# Patient Record
Sex: MALE | Race: WHITE | Employment: FULL TIME | URBAN - METROPOLITAN AREA
[De-identification: names, ages, dates, MRNs, and addresses within clinical notes are randomized per-mention and may not be internally consistent; named-entity substitution may affect disease eponyms.]

---

## 2017-05-08 ENCOUNTER — OFFICE VISIT (OUTPATIENT)
Dept: ORTHOPEDIC SURGERY | Age: 57
End: 2017-05-08

## 2017-05-08 VITALS
DIASTOLIC BLOOD PRESSURE: 83 MMHG | BODY MASS INDEX: 32.85 KG/M2 | WEIGHT: 256 LBS | RESPIRATION RATE: 16 BRPM | SYSTOLIC BLOOD PRESSURE: 140 MMHG | HEIGHT: 74 IN | HEART RATE: 50 BPM

## 2017-05-08 DIAGNOSIS — M51.26 OTHER INTERVERTEBRAL DISC DISPLACEMENT, LUMBAR REGION: Primary | ICD-10-CM

## 2017-05-08 DIAGNOSIS — M54.16 RADICULOPATHY, LUMBAR REGION: ICD-10-CM

## 2017-05-08 RX ORDER — GABAPENTIN 800 MG/1
800 TABLET ORAL 3 TIMES DAILY
Qty: 90 TAB | Refills: 5 | Status: SHIPPED | OUTPATIENT
Start: 2017-05-08 | End: 2018-05-16 | Stop reason: SDUPTHER

## 2017-05-08 NOTE — MR AVS SNAPSHOT
Visit Information Date & Time Provider Department Dept. Phone Encounter #  
 5/8/2017  8:30 AM Barron Aviles MD [address] [facility] [place] [phone] [id number] Follow-up Instructions Return in about 6 months (around 11/8/2017). Your Appointments 11/8/2017 11:30 AM  
ESTABLISHED PATIENT with SHELBY Rivera [facility] ([address]) Appt Note: Return in about 6 months (around 11/4/2017) for FU with SD PSA. [address], [place] [address] [facility] [address]  
  
   
 [address], [address] [facility] [address] Upcoming Health Maintenance Date Due Hepatitis C Screening 1960 Pneumococcal 19-64 Highest Risk (1 of 3 - PCV13) 10/6/1978 DTaP/Tdap/Td series (1 - Tdap) 10/6/1980 FOBT Q 1 YEAR AGE 50-75 10/6/2009 INFLUENZA AGE 9 TO ADULT 8/1/2017 Allergies as of 5/8/2017  Review Complete On: 5/8/2017 By: Braron Aviles MD  
 No Known Allergies Current Immunizations  Never Reviewed No immunizations on file. Not reviewed this visit You Were Diagnosed With   
  
 Codes Comments Other intervertebral disc displacement, lumbar region    -  Primary ICD-10-CM: M51.26 
ICD-9-CM: 722.10 Radiculopathy, lumbar region     ICD-10-CM: M54.16 
ICD-9-CM: 724.4 Vitals BP Pulse Resp Height(growth percentile) Weight(growth percentile) BMI  
 140/83 (BP 1 Location: Left arm, BP Patient Position: Sitting) (!) 50 16 6' 2\" (1.88 m) 256 lb (116.1 kg) 32.87 kg/m2 Smoking Status Never Smoker Vitals History BMI and BSA Data Body Mass Index Body Surface Area  
 32.87 kg/m 2 2.46 m 2 Preferred Pharmacy Pharmacy Name Phone [facility] [address] [facility], [address] [facility] [phone] Your Updated Medication List  
  
   
 This list is accurate as of: 5/8/17  8:53 AM.  Always use your most recent med list.  
  
  
  
  
 BYSTOLIC 5 mg tablet Generic drug:  nebivolol Take  by mouth daily. * gabapentin 800 mg tablet Commonly known as:  NEURONTIN Take 1 Tab by mouth three (3) times daily. * gabapentin 800 mg tablet Commonly known as:  NEURONTIN Take 1 Tab by mouth three (3) times daily. LIPITOR 40 mg tablet Generic drug:  atorvastatin Take  by mouth daily. NITROSTAT 0.4 mg SL tablet Generic drug:  nitroglycerin 0.4 mg.  
  
 NORVASC 10 mg tablet Generic drug:  amLODIPine Take  by mouth daily. PREVACID 30 mg capsule Generic drug:  lansoprazole Take  by mouth Daily (before breakfast). tadalafil 20 mg tablet Commonly known as:  CIALIS Take 1 Tab by mouth daily as needed. VALTREX 500 mg tablet Generic drug:  valACYclovir Take 500 mg by mouth daily. * Notice: This list has 2 medication(s) that are the same as other medications prescribed for you. Read the directions carefully, and ask your doctor or other care provider to review them with you. Prescriptions Sent to Pharmacy Refills  
 gabapentin (NEURONTIN) 800 mg tablet 5 Sig: Take 1 Tab by mouth three (3) times daily. Class: Normal  
 Pharmacy: [address], [address] [facility] Lizandro Monterroso  #: [phone] Route: Oral  
  
Follow-up Instructions Return in about 6 months (around 11/8/2017). Introducing [facility]! King Suzanne introduces [employer] patient portal. Now you can access parts of your medical record, email your doctor's office, and request medication refills online. 1. In your internet browser, go to [email or web address] [email or web address]/[facility] 2. Click on the First Time User? Click Here link in the Sign In box. You will see the New Member Sign Up page. 3. Enter your [employer] Access Code exactly as it appears below.  You will not need to use this code after youve completed the sign-up process. If you do not sign up before the expiration date, you must request a new code. · [employer] Access Code: [email or web address] 
Expires: 8/2/2017 12:02 PM 
 
4. Enter the last four digits of your Social Security Number (xxxx) and Date of Birth (mm/dd/yyyy) as indicated and click Submit. You will be taken to the next sign-up page. 5. Create a [employer] ID. This will be your [employer] login ID and cannot be changed, so think of one that is secure and easy to remember. 6. Create a [employer] password. You can change your password at any time. 7. Enter your Password Reset Question and Answer. This can be used at a later time if you forget your password. 8. Enter your e-mail address. You will receive e-mail notification when new information is available in [address] 9. Click Sign Up. You can now view and download portions of your medical record. 10. Click the Download Summary menu link to download a portable copy of your medical information. If you have questions, please visit the Frequently Asked Questions section of the [employer] website. Remember, [employer] is NOT to be used for urgent needs. For medical emergencies, dial 911. Now available from your iPhone and Android! Please provide this summary of care documentation to your next provider. Your primary care clinician is listed as RACHAEL CHAWLA. If you have any questions after today's visit, please call [phone].

## 2017-05-08 NOTE — PROGRESS NOTES
[facility]  [address]  [address], [address]  Phone: [phone]  Fax: [phone]        PROGRESS NOTE      HISTORY OF PRESENT ILLNESS:  The patient is a 61 y.o. male and was seen today for follow up of chronic low back pain into the BLE laterally to the knee, noting it previously extended in roughly an L4 distribution. He states pain is exacerbated when bending. At that time he reports he was doing fairly well until he fell off of a ladder on June 25, 2015. He has subsequently been seen in the emergency room at [facility]. Notes are reviewed. According to the patient, he had a CT scan, which is nondiagnostic. The previous fall has now been covered under Workers' Compensation, but we have no record. He was seen by Froy Jenkins NP on 10/13/16 and stated the right-sided L4-L5 facet joint blocks performed on 9/29/15 provided two days of relief. Pt has previously been treated with MDP. He has been taking Neurontin 800 mg TID with significant relief. Pt uses cryotherapy for flare ups occasionally with benefit. Lumbar spine MRI dated 4/2/13 per report revealed at L4-L5 there was a shallow posterior disc protrusion with annular fissure. There was moderate bilateral neuroforaminal stenosis. At L5-S1, there was a shallow posterior disc protrusion and moderate bilateral neuroforaminal stenosis. Preliminary reading of lumbar spine 2 to 3 views revealed paper clip markings over right L4-L5 facet, mild degenerative changes, no acute pathology identified. At his last clinic appointment, patient wished to continue his current treatment. He was provided with refills of Neurontin 800 mg TID. I recommended he increase the frequency of HEP to daily. The patient returns today with pain location and distribution remain unchanged. He rates pain 0.5-2/10, an improvement since his last visit (0-7/10).  More recently, he experiences a vibrating sensation throughout the LLE extending laterally from the hip to the ankle. Pt admits completing his HEP 1x/week. He is compliant with Neurontin 800 mg TID which offers significant relief. Pt notes improved activity tolerance with Neurontin.  reviewed. Past Medical History:   Diagnosis Date    Chronic headaches     SINCE STROKE    GERD (gastroesophageal reflux disease)     Herniated disc     History of fall 06-25-15    Aiden Ren off of a ladder    Hypercholesterolemia     Hypertension     Lung nodules     Migraine     Neuroforaminal stenosis of spine     Prostate cancer ([facility] [address] 5/10/13    [id number] Cheryl Grade 3+4 Adenocarcinoma of the Prostate in 1/12 cores, prostate volume of 30 cm3, Pre-biopsy PSA= 9.2    Unspecified cerebral artery occlusion with cerebral infarction 10/11/2010        Social History     Social History    Marital status:      Spouse name: N/A    Number of children: N/A    Years of education: N/A     Occupational History    Not on file. Social History Main Topics    Smoking status: Never Smoker    Smokeless tobacco: Never Used    Alcohol use 0.0 oz/week     0 Standard drinks or equivalent per week      Comment: VERY SELDOM    Drug use: No    Sexual activity: Not Currently     Partners: Female     Other Topics Concern    Not on file     Social History Narrative       Current Outpatient Prescriptions   Medication Sig Dispense Refill    gabapentin (NEURONTIN) 800 mg tablet Take 1 Tab by mouth three (3) times daily. 90 Tab 5    gabapentin (NEURONTIN) 800 mg tablet Take 1 Tab by mouth three (3) times daily. 90 Tab 5    nitroglycerin (NITROSTAT) 0.4 mg SL tablet 0.4 mg.      tadalafil (CIALIS) 20 mg tablet Take 1 Tab by mouth daily as needed. 6 Tab 0    lansoprazole (PREVACID) 30 mg capsule Take  by mouth Daily (before breakfast).  nebivolol (BYSTOLIC) 5 mg tablet Take  by mouth daily.  atorvastatin (LIPITOR) 40 mg tablet Take  by mouth daily.       amLODIPine (NORVASC) 10 mg tablet Take  by mouth daily.  valACYclovir (VALTREX) 500 mg tablet Take 500 mg by mouth daily. No Known Allergies       PHYSICAL EXAMINATION    Visit Vitals    /83 (BP 1 Location: Left arm, BP Patient Position: Sitting)    Pulse (!) 50    Resp 16    Ht 6' 2\" (1.88 m)    Wt 256 lb (116.1 kg)    BMI 32.87 kg/m2       CONSTITUTIONAL: NAD, A&O x 3  SENSATION: Decreased sensation to light touch at left lateral thigh. Sensation to light touch otherwise intact. RANGE OF MOTION: The patient has full passive range of motion in all four extremities. MOTOR:  Straight Leg Raise: Negative, bilateral               Hip Flex Knee Ext Knee Flex Ankle DF GTE Ankle PF Tone   Right +4/5 +4/5 +4/5 +4/5 +4/5 +4/5 +4/5   Left +4/5 +4/5 +4/5 +4/5 +4/5 +4/5 +4/5       ASSESSMENT   Malou Painter was seen today for back pain and leg pain. Diagnoses and all orders for this visit:    Other intervertebral disc displacement, lumbar region    Radiculopathy, lumbar region    Other orders  -     gabapentin (NEURONTIN) 800 mg tablet; Take 1 Tab by mouth three (3) times daily. IMPRESSION AND PLAN:  Patient has been doing well on Neurontin 800 mg TID. He is not interested in another neuropathic pain medication, lumbar blocks or surgical intervention. Patient wished to continue his current treatment. He was provided with refills of Neurontin 800 mg TID. I recommend he increase the frequency of HEP to daily. I will see the patient back in 6 month's time or earlier if needed. Written by Bridget Patton, as dictated by Oleksandr Matute MD  I examined the patient, reviewed and agree with the note.

## 2017-11-08 ENCOUNTER — OFFICE VISIT (OUTPATIENT)
Dept: ORTHOPEDIC SURGERY | Age: 57
End: 2017-11-08

## 2017-11-08 VITALS
DIASTOLIC BLOOD PRESSURE: 76 MMHG | RESPIRATION RATE: 14 BRPM | SYSTOLIC BLOOD PRESSURE: 115 MMHG | HEART RATE: 53 BPM | BODY MASS INDEX: 32.91 KG/M2 | WEIGHT: 256.4 LBS | HEIGHT: 74 IN

## 2017-11-08 DIAGNOSIS — M54.16 RADICULOPATHY, LUMBAR REGION: ICD-10-CM

## 2017-11-08 DIAGNOSIS — M47.817 LUMBOSACRAL SPONDYLOSIS WITHOUT MYELOPATHY: Primary | ICD-10-CM

## 2017-11-08 DIAGNOSIS — M51.26 OTHER INTERVERTEBRAL DISC DISPLACEMENT, LUMBAR REGION: ICD-10-CM

## 2017-11-08 RX ORDER — AMOXICILLIN AND CLAVULANATE POTASSIUM 875; 125 MG/1; MG/1
TABLET, FILM COATED ORAL
COMMUNITY
Start: 2017-11-06 | End: 2018-04-17

## 2017-11-08 RX ORDER — GABAPENTIN 800 MG/1
800 TABLET ORAL 3 TIMES DAILY
Qty: 90 TAB | Refills: 5 | Status: SHIPPED | OUTPATIENT
Start: 2017-11-08 | End: 2018-05-01 | Stop reason: SDUPTHER

## 2017-11-08 NOTE — PROGRESS NOTES
[facility]  [address]  [address], [address]   Phone: [phone]  Fax: [phone]        PROGRESS NOTE      HISTORY OF PRESENT ILLNESS:  The patient is a 61 y.o. male and was seen today for follow up of chronic low back pain into the BLE laterally to the knee, noting it previously extended in roughly an L4 distribution. More recently, he experiences a vibrating sensation throughout the LLE extending laterally from the hip to the ankle. He states pain is exacerbated when bending. Pt reports he was doing fairly well until he fell off of a ladder on June 25, 2015. He has subsequently been seen in the emergency room at [facility]. Notes are reviewed. According to the patient, he had a CT scan, which is nondiagnostic. The previous fall has now been covered under Workers' Compensation, but we have no record. He was seen by Sid Polanco NP on 10/13/16 and stated the right-sided L4-L5 facet joint blocks performed on 9/29/15 provided two days of relief. Pt has previously been treated with MDP. He has been taking Neurontin 800 mg TID with significant relief. Pt uses cryotherapy for flare ups occasionally with benefit. Lumbar spine MRI dated 4/2/13 per report revealed at L4-L5 there was a shallow posterior disc protrusion with annular fissure. There was moderate bilateral neuroforaminal stenosis. At L5-S1, there was a shallow posterior disc protrusion and moderate bilateral neuroforaminal stenosis. Preliminary reading of lumbar spine 2 to 3 views revealed paper clip markings over right L4-L5 facet, mild degenerative changes, no acute pathology identified. At his last clinic appointment, patient had been doing well on Neurontin 800 mg TID. He was not interested in another neuropathic pain medication, lumbar blocks or surgical intervention. Patient wished to continue his current treatment. He was provided with refills of Neurontin 800 mg TID.  I recommended he increase the frequency of HEP to daily. The patient returns today with low back and left buttock pain extending in an S1 distribution to the calf. He rates pain 2-6/10, elevated since his last visit (0.5-2/10). Pt attributes his increase in pain to the cooler weather. He completes his HEP partially. Pt is compliant with Neurontin 800 mg TID.  reviewed. Past Medical History:   Diagnosis Date    Chronic headaches     SINCE STROKE    GERD (gastroesophageal reflux disease)     Herniated disc     History of fall 06-25-15    Monique Horsfall off of a ladder    Hypercholesterolemia     Hypertension     Lung nodules     Migraine     Neuroforaminal stenosis of spine     Prostate cancer ([facility] [address] 5/10/13    [id number] Cheryl Grade 3+4 Adenocarcinoma of the Prostate in 1/12 cores, prostate volume of 30 cm3, Pre-biopsy PSA= 9.2    Unspecified cerebral artery occlusion with cerebral infarction 10/11/2010        Social History     Social History    Marital status:      Spouse name: N/A    Number of children: N/A    Years of education: N/A     Occupational History    Not on file. Social History Main Topics    Smoking status: Never Smoker    Smokeless tobacco: Never Used    Alcohol use 0.0 oz/week     0 Standard drinks or equivalent per week      Comment: VERY SELDOM    Drug use: No    Sexual activity: Not Currently     Partners: Female     Other Topics Concern    Not on file     Social History Narrative       Current Outpatient Prescriptions   Medication Sig Dispense Refill    amoxicillin-clavulanate (AUGMENTIN) 875-125 mg per tablet       gabapentin (NEURONTIN) 800 mg tablet Take 1 Tab by mouth three (3) times daily. 90 Tab 5    gabapentin (NEURONTIN) 800 mg tablet Take 1 Tab by mouth three (3) times daily. 90 Tab 5    tadalafil (CIALIS) 20 mg tablet Take 1 Tab by mouth daily as needed. 6 Tab 0    lansoprazole (PREVACID) 30 mg capsule Take  by mouth Daily (before breakfast).       nebivolol (BYSTOLIC) 5 mg tablet Take  by mouth daily.  atorvastatin (LIPITOR) 40 mg tablet Take  by mouth daily.  amLODIPine (NORVASC) 10 mg tablet Take  by mouth daily.  valACYclovir (VALTREX) 500 mg tablet Take 500 mg by mouth daily.  nitroglycerin (NITROSTAT) 0.4 mg SL tablet 0.4 mg. No Known Allergies       PHYSICAL EXAMINATION    Visit Vitals    /76    Pulse (!) 53    Resp 14    Ht 6' 2\" (1.88 m)    Wt 256 lb 6.4 oz (116.3 kg)    BMI 32.92 kg/m2       CONSTITUTIONAL: NAD, A&O x 3  SENSATION: Decreased sensation to light touch at L4/5 of the LLE. Sensation to light touch otherwise intact. RANGE OF MOTION: The patient has full passive range of motion in all four extremities. MOTOR:  Straight Leg Raise: Negative, bilateral               Hip Flex Knee Ext Knee Flex Ankle DF GTE Ankle PF Tone   Right +4/5 +4/5 +4/5 +4/5 +4/5 +4/5 +4/5   Left +4/5 +4/5 +4/5 +4/5 +4/5 +4/5 +4/5       ASSESSMENT   Diagnoses and all orders for this visit:    1. Lumbosacral spondylosis without myelopathy    2. Radiculopathy, lumbar region    3. Other intervertebral disc displacement, lumbar region    Other orders  -     gabapentin (NEURONTIN) 800 mg tablet; Take 1 Tab by mouth three (3) times daily. IMPRESSION AND PLAN:  The patient is not interested in surgical intervention or lumbar blocks at this time. He wishes to continue with Neurontin 800 mg TID and was given refills. I recommend he complete HEP daily. Patient will f/u with NP in 6 month's. Written by Latoya Claros, as dictated by Margo Lundberg MD  I examined the patient, reviewed and agree with the note.

## 2018-05-01 PROBLEM — N52.31 ERECTILE DYSFUNCTION AFTER RADICAL PROSTATECTOMY: Status: ACTIVE | Noted: 2018-05-01

## 2018-05-01 PROBLEM — R10.2 PELVIC PAIN: Status: ACTIVE | Noted: 2018-05-01

## 2018-05-16 ENCOUNTER — OFFICE VISIT (OUTPATIENT)
Dept: ORTHOPEDIC SURGERY | Age: 58
End: 2018-05-16

## 2018-05-16 VITALS
DIASTOLIC BLOOD PRESSURE: 67 MMHG | HEART RATE: 57 BPM | OXYGEN SATURATION: 95 % | SYSTOLIC BLOOD PRESSURE: 118 MMHG | HEIGHT: 74 IN | BODY MASS INDEX: 32.85 KG/M2 | WEIGHT: 256 LBS

## 2018-05-16 DIAGNOSIS — M54.16 RADICULOPATHY, LUMBAR REGION: ICD-10-CM

## 2018-05-16 DIAGNOSIS — M51.26 OTHER INTERVERTEBRAL DISC DISPLACEMENT, LUMBAR REGION: ICD-10-CM

## 2018-05-16 DIAGNOSIS — M51.26 LUMBAR HERNIATED DISC: Primary | ICD-10-CM

## 2018-05-16 RX ORDER — GABAPENTIN 800 MG/1
800 TABLET ORAL 3 TIMES DAILY
Qty: 90 TAB | Refills: 5 | Status: SHIPPED | OUTPATIENT
Start: 2018-05-16 | End: 2018-05-16 | Stop reason: SDUPTHER

## 2018-05-16 RX ORDER — GABAPENTIN 800 MG/1
800 TABLET ORAL
Qty: 90 TAB | Refills: 5 | Status: SHIPPED | OUTPATIENT
Start: 2018-05-16 | End: 2018-11-19 | Stop reason: SDUPTHER

## 2018-05-16 NOTE — PROGRESS NOTES
[facility]  [address], [address]  Phone: [phone]  Fax: [phone]        PROGRESS NOTE      HISTORY OF PRESENT ILLNESS:  The patient is a 61 y.o. male and was seen today for follow up of chronic low back and left buttock pain extending in an S1 distribution to the calf. He states pain is exacerbated when bending. Pt reports he was doing fairly well until he fell off of a ladder on June 25, 2015. He has subsequently been seen in the emergency room at [facility]. Notes are reviewed. According to the patient, he had a CT scan, which is nondiagnostic. The previous fall was apparently covered under Workers' Compensation however, we have no record of this. He was seen by Ludivina Dubin NP on 10/13/16 and stated the right-sided L4-L5 facet joint blocks performed on 9/29/15 provided two days of relief. Pt has previously been treated with MDP. He has been taking Neurontin 800 mg TID with significant relief. Pt uses cryotherapy for flare ups occasionally with benefit. Lumbar spine MRI dated 4/2/13 per report revealed at L4-L5 there was a shallow posterior disc protrusion with annular fissure. There was moderate bilateral neuroforaminal stenosis. At L5-S1, there was a shallow posterior disc protrusion and moderate bilateral neuroforaminal stenosis. Preliminary reading of lumbar spine 2 to 3 views revealed paper clip markings over right L4-L5 facet, mild degenerative changes, no acute pathology identified. At his last clinic appointment, the patient was not interested in surgical intervention or lumbar blocks at that time. He wished to continue with Neurontin 800 mg TID and was given refills. I recommended he complete HEP daily. The patient returns today with pain location and distribution remain unchanged. He rates pain 0-3/10, an improvement since his last visit (2-6/10). Pt attributes his increase in pain to the cooler weather.  He admits inconsistency with his HEP. Pt is compliant with Neurontin 800 mg TID.  reviewed. Body mass index is 32.87 kg/(m^2). PCP: Toma Rangel MD      Past Medical History:   Diagnosis Date    Chronic headaches     SINCE STROKE    GERD (gastroesophageal reflux disease)     Herniated disc     History of fall 06-25-15    Dayo Leavens off of a ladder    Hypercholesterolemia     Hypertension     Lung nodules     Migraine     Neuroforaminal stenosis of spine     Prostate cancer ([facility] [address] 05/10/2013    [id number] Cheryl Grade 3+4 Adenocarcinoma of the Prostate in 1/12 cores, prostate volume of 30 cm3, Pre-biopsy PSA= 9.2    Unspecified cerebral artery occlusion with cerebral infarction 10/11/2010        Social History     Social History    Marital status:      Spouse name: N/A    Number of children: N/A    Years of education: N/A     Occupational History    Not on file. Social History Main Topics    Smoking status: Never Smoker    Smokeless tobacco: Never Used    Alcohol use 0.0 oz/week     0 Standard drinks or equivalent per week      Comment: VERY SELDOM    Drug use: No    Sexual activity: Not on file     Other Topics Concern    Not on file     Social History Narrative       Current Outpatient Prescriptions   Medication Sig Dispense Refill    gabapentin (NEURONTIN) 800 mg tablet Take 1 Tab by mouth three (3) times daily (with meals). Indications: NEUROPATHIC PAIN 90 Tab 5    atorvastatin (LIPITOR) 10 mg tablet Take  by mouth daily.  lansoprazole (PREVACID) 15 mg capsule Take  by mouth Daily (before breakfast).  CIALIS 5 mg tablet Take one tablet daily as needed for ED 90 Tab 3    tadalafil (CIALIS) 20 mg tablet Take 1 Tab by mouth daily as needed. 6 Tab 0    nebivolol (BYSTOLIC) 5 mg tablet Take  by mouth daily.          Allergies   Allergen Reactions    Metoprolol Succinate Other (comments)    Tizanidine Rash          PHYSICAL EXAMINATION    Visit Vitals    /67    Pulse (!) 57    Ht 6' 2\" (1.88 m)    Wt 116.1 kg (256 lb)    SpO2 95%    BMI 32.87 kg/m2       CONSTITUTIONAL: NAD, A&O x 3  SENSATION: Decreased sensation to light touch at L5 of the LLE. Sensation to light touch otherwise intact. RANGE OF MOTION: The patient has full passive range of motion in all four extremities. MOTOR:  Straight Leg Raise: Negative, bilateral               Hip Flex Knee Ext Knee Flex Ankle DF GTE Ankle PF Tone   Right +4/5 +4/5 +4/5 +4/5 +4/5 +4/5 +4/5   Left +4/5 +4/5 +4/5 +4/5 +4/5 +4/5 +4/5       ASSESSMENT   Diagnoses and all orders for this visit:    1. Lumbar herniated disc  -     gabapentin (NEURONTIN) 800 mg tablet; Take 1 Tab by mouth three (3) times daily (with meals). Indications: NEUROPATHIC PAIN    2. Radiculopathy, lumbar region  -     gabapentin (NEURONTIN) 800 mg tablet; Take 1 Tab by mouth three (3) times daily (with meals). Indications: NEUROPATHIC PAIN    3. Other intervertebral disc displacement, lumbar region  -     gabapentin (NEURONTIN) 800 mg tablet; Take 1 Tab by mouth three (3) times daily (with meals). Indications: NEUROPATHIC PAIN          IMPRESSION AND PLAN:  Patient wished to continue his current treatment. He was provided with refills of Neurontin 800 mg TID. I recommend he increase the frequency of HEP to daily. I will see the patient back in 6 month's time or earlier if needed. Written by Konrad Phillips, as dictated by Sadia Juan MD  I examined the patient, reviewed and agree with the note.

## 2018-05-16 NOTE — MR AVS SNAPSHOT
[address] [facility] 
 
 
 [address] 200 [facility] 
[phone] Patient: Laura Wright MRN: [id number] :1960 Visit Information Date & Time Provider Department Dept. Phone Encounter #  
 2018  4:00 PM Josue Gerber MD [facility] [place] [phone] Follow-up Instructions Return in about 6 months (around 2018). Your Appointments 2019 10:00 AM  
ESTABLISHED PATIENT with Colette Morgan MD  
[facility]) Appt Note: Return in about 1 year (around 2019) for FU with prior PSA. [address] [facility], [place] [address] 2201 [facility] [address]  
  
   
 [address] [facility], [address] [place] [id number] Upcoming Health Maintenance Date Due Hepatitis C Screening 1960 Pneumococcal 19-64 Highest Risk (1 of 3 - PCV13) 10/6/1978 DTaP/Tdap/Td series (1 - Tdap) 10/6/1980 FOBT Q 1 YEAR AGE 50-75 10/6/2009 Influenza Age 5 to Adult 2018 Allergies as of 2018  Review Complete On: 2018 By: Josue Gerber MD  
  
 Severity Noted Reaction Type Reactions Metoprolol Succinate Medium 2012    Other (comments) Tizanidine Low 2012    Rash Current Immunizations  Never Reviewed No immunizations on file. Not reviewed this visit You Were Diagnosed With   
  
 Codes Comments Lumbar herniated disc    -  Primary ICD-10-CM: M51.26 
ICD-9-CM: 722.10 Radiculopathy, lumbar region     ICD-10-CM: M54.16 
ICD-9-CM: 724.4 Other intervertebral disc displacement, lumbar region     ICD-10-CM: M51.26 
ICD-9-CM: 722.10 Vitals BP Pulse Height(growth percentile) Weight(growth percentile) SpO2 BMI  
 118/67 (!) 57 6' 2\" (1.88 m) 256 lb (116.1 kg) 95% 32.87 kg/m2 Smoking Status Never Smoker Vitals History BMI and BSA Data Body Mass Index Body Surface Area  
 32.87 kg/m 2 2.46 m 2 Preferred Pharmacy Pharmacy Name Phone [facility] [place] [address] [phone] Your Updated Medication List  
  
   
This list is accurate as of 5/16/18  5:13 PM.  Always use your most recent med list.  
  
  
  
  
 BYSTOLIC 5 mg tablet Generic drug:  nebivolol Take  by mouth daily. gabapentin 800 mg tablet Commonly known as:  NEURONTIN Take 1 Tab by mouth three (3) times daily (with meals). Indications: NEUROPATHIC PAIN  
  
 LIPITOR 10 mg tablet Generic drug:  atorvastatin Take  by mouth daily. PREVACID 15 mg capsule Generic drug:  lansoprazole Take  by mouth Daily (before breakfast). * tadalafil 20 mg tablet Commonly known as:  CIALIS Take 1 Tab by mouth daily as needed. * CIALIS 5 mg tablet Generic drug:  tadalafil Take one tablet daily as needed for ED * Notice: This list has 2 medication(s) that are the same as other medications prescribed for you. Read the directions carefully, and ask your doctor or other care provider to review them with you. Prescriptions Sent to Pharmacy Refills  
 gabapentin (NEURONTIN) 800 mg tablet 5 Sig: Take 1 Tab by mouth three (3) times daily (with meals). Indications: NEUROPATHIC PAIN Class: Normal  
 Pharmacy: [id number]  [address] #: [phone] Route: Oral  
  
Follow-up Instructions Return in about 6 months (around 11/16/2018). Introducing [facility]! [employer] introduces [employer] patient portal. Now you can access parts of your medical record, email your doctor's office, and request medication refills online. 1. In your internet browser, go to [email or web address] 2. Click on the First Time User? Click Here link in the Sign In box. You will see the New Member Sign Up page. 3. Enter your Ludi Access Code exactly as it appears below. You will not need to use this code after youve completed the sign-up process. If you do not sign up before the expiration date, you must request a new code. · [employer]i Access Code: [email or web address] Expires: 7/16/2018  1:13 PM 
 
4. Enter the last four digits of your Social Security Number (xxxx) and Date of Birth (mm/dd/yyyy) as indicated and click Submit. You will be taken to the next sign-up page. 5. Create a Ludi ID. This will be your Ludi login ID and cannot be changed, so think of one that is secure and easy to remember. 6. Create a Ludi password. You can change your password at any time. 7. Enter your Password Reset Question and Answer. This can be used at a later time if you forget your password. 8. Enter your e-mail address. You will receive e-mail notification when new information is available in [address] 9. Click Sign Up. You can now view and download portions of your medical record. 10. Click the Download Summary menu link to download a portable copy of your medical information. If you have questions, please visit the Frequently Asked Questions section of the Ludi website. Remember, Ludi is NOT to be used for urgent needs. For medical emergencies, dial 911. Now available from your iPhone and Android! Please provide this summary of care documentation to your next provider. Your primary care clinician is listed as Hemalatha Monae. If you have any questions after today's visit, please call [phone].

## 2018-11-19 ENCOUNTER — OFFICE VISIT (OUTPATIENT)
Dept: ORTHOPEDIC SURGERY | Age: 58
End: 2018-11-19

## 2018-11-19 VITALS
WEIGHT: 260 LBS | SYSTOLIC BLOOD PRESSURE: 143 MMHG | BODY MASS INDEX: 33.37 KG/M2 | TEMPERATURE: 98.2 F | HEART RATE: 53 BPM | DIASTOLIC BLOOD PRESSURE: 80 MMHG | HEIGHT: 74 IN | OXYGEN SATURATION: 98 %

## 2018-11-19 DIAGNOSIS — M51.26 OTHER INTERVERTEBRAL DISC DISPLACEMENT, LUMBAR REGION: ICD-10-CM

## 2018-11-19 DIAGNOSIS — M51.26 LUMBAR HERNIATED DISC: ICD-10-CM

## 2018-11-19 DIAGNOSIS — M54.16 RADICULOPATHY, LUMBAR REGION: Primary | ICD-10-CM

## 2018-11-19 RX ORDER — GABAPENTIN 800 MG/1
800 TABLET ORAL
Qty: 90 TAB | Refills: 5 | Status: SHIPPED | OUTPATIENT
Start: 2018-11-19 | End: 2019-04-17 | Stop reason: SDUPTHER

## 2018-11-19 NOTE — PROGRESS NOTES
[facility]  [address], [address]  Phone: [phone]  Fax: [phone]        PROGRESS NOTE      HISTORY OF PRESENT ILLNESS:  The patient is a 60 y.o. male and was seen today for follow up of chronic low back and left buttock pain extending in an S1 distribution to the calf. He states pain is exacerbated when bending. Pt reports he was doing fairly well until he fell off of a ladder on June 25, 2015. He has subsequently been seen in the emergency room at [facility]. Notes are reviewed. According to the patient, he had a CT scan, which is nondiagnostic. The previous fall was apparently covered under Workers' Compensation however, we have no record of this. He was seen by Kelli Thornton NP on 10/13/16 and stated the right-sided L4-L5 facet joint blocks performed on 9/29/15 provided two days of relief. Pt has previously been treated with MDP. He has been taking Neurontin 800 mg TID with significant relief. Pt uses cryotherapy for flare ups occasionally with benefit. Lumbar spine MRI dated 4/2/13 per report revealed at L4-L5 there was a shallow posterior disc protrusion with annular fissure. There was moderate bilateral neuroforaminal stenosis. At L5-S1, there was a shallow posterior disc protrusion and moderate bilateral neuroforaminal stenosis. Preliminary reading of lumbar spine 2 to 3 views revealed paper clip markings over right L4-L5 facet, mild degenerative changes, no acute pathology identified. At his last clinical appointment, patient wished to continue his current treatment. He was provided with refills of Neurontin 800 mg TID. The patient returns today with pain location and distribution remain unchanged. He rates his pain 0-1/10, a slight decrease from his last visit (0-3/10). He reports a flare up of his pain in October, with pain around 5/10. He treats with cryotherapy. He states that overall he continues to do well.  Pt is compliant with Neurontin 800 mg TID. He is not completing his HEP.  reviewed. Body mass index is 33.38 kg/m². PCP: Branden Garza MD      Past Medical History:   Diagnosis Date    Chronic headaches     SINCE STROKE    GERD (gastroesophageal reflux disease)     Herniated disc     History of fall 06-25-15    Candice Garcia off of a ladder    Hypercholesterolemia     Hypertension     Lung nodules     Migraine     Neuroforaminal stenosis of spine     Prostate cancer ([facility] [address] 05/10/2013    [id number] [place] Grade 3+4 Adenocarcinoma of the Prostate in 1/12 cores, prostate volume of 30 cm3, Pre-biopsy PSA= 9.2    Unspecified cerebral artery occlusion with cerebral infarction 10/11/2010        Social History     Socioeconomic History    Marital status:      Spouse name: Not on file    Number of children: Not on file    Years of education: Not on file    Highest education level: Not on file   Social Needs    Financial resource strain: Not on file    Food insecurity - worry: Not on file    Food insecurity - inability: Not on file   [place] [employer] needs - medical: Not on file   Cover needs - non-medical: Not on file   Occupational History    Not on file   Tobacco Use    Smoking status: Never Smoker    Smokeless tobacco: Never Used   Substance and Sexual Activity    Alcohol use: Yes     Alcohol/week: 0.0 oz     Comment: VERY SELDOM    Drug use: No    Sexual activity: Not on file   Other Topics Concern    Not on file   Social History Narrative    Not on file       Current Outpatient Medications   Medication Sig Dispense Refill    gabapentin (NEURONTIN) 800 mg tablet Take 1 Tab by mouth three (3) times daily (with meals). Indications: NEUROPATHIC PAIN 90 Tab 5    atorvastatin (LIPITOR) 10 mg tablet Take  by mouth daily.  lansoprazole (PREVACID) 15 mg capsule Take  by mouth Daily (before breakfast).       CIALIS 5 mg tablet Take one tablet daily as needed for ED 90 Tab 3    tadalafil (CIALIS) 20 mg tablet Take 1 Tab by mouth daily as needed. 6 Tab 0    nebivolol (BYSTOLIC) 5 mg tablet Take  by mouth daily. Allergies   Allergen Reactions    Metoprolol Succinate Other (comments)    Tizanidine Rash          PHYSICAL EXAMINATION    Visit Vitals  /80   Pulse (!) 53   Temp 98.2 °F (36.8 °C) (Oral)   Ht 6' 2\" (1.88 m)   Wt 260 lb (117.9 kg)   SpO2 98%   BMI 33.38 kg/m²       CONSTITUTIONAL: NAD, A&O x 3  SENSATION:Decreased sensation to light touch L5 in the LLE. Sensation to light touch otherwise intact. RANGE OF MOTION: The patient has full passive range of motion in all four extremities. MOTOR:  Straight Leg Raise: Negative, bilateral               Hip Flex Knee Ext Knee Flex Ankle DF GTE Ankle PF Tone   Right +4/5 +4/5 +4/5 +4/5 +4/5 +4/5 +4/5   Left +4/5 +4/5 +4/5 +4/5 +4/5 +4/5 +4/5       ASSESSMENT   Diagnoses and all orders for this visit:    1. Radiculopathy, lumbar region    2. Other intervertebral disc displacement, lumbar region    3. BMI 32.0-32.9,adult          IMPRESSION AND PLAN:  Patient wished to continue his current treatment. I provided him with refills of his Neurontin 800 mg TID. Patient is neurologically intact. I recommend he increase the frequency of his HEP to daily. I will see the patient back in 6 month's time or earlier if needed. Written by Ambrose Marie, as dictated by George Temple MD  I examined the patient, reviewed and agree with the note.

## 2019-05-22 ENCOUNTER — OFFICE VISIT (OUTPATIENT)
Dept: ORTHOPEDIC SURGERY | Age: 59
End: 2019-05-22

## 2019-05-22 VITALS
HEIGHT: 74 IN | OXYGEN SATURATION: 100 % | WEIGHT: 247.8 LBS | HEART RATE: 47 BPM | RESPIRATION RATE: 14 BRPM | DIASTOLIC BLOOD PRESSURE: 74 MMHG | BODY MASS INDEX: 31.8 KG/M2 | SYSTOLIC BLOOD PRESSURE: 130 MMHG

## 2019-05-22 DIAGNOSIS — M54.16 RADICULOPATHY, LUMBAR REGION: Primary | ICD-10-CM

## 2019-05-22 DIAGNOSIS — M51.26 OTHER INTERVERTEBRAL DISC DISPLACEMENT, LUMBAR REGION: ICD-10-CM

## 2019-05-22 RX ORDER — GABAPENTIN 600 MG/1
600 TABLET ORAL 3 TIMES DAILY
Qty: 90 TAB | Refills: 5 | Status: SHIPPED | OUTPATIENT
Start: 2019-05-22 | End: 2019-10-29 | Stop reason: SDUPTHER

## 2019-05-22 RX ORDER — LISINOPRIL 20 MG/1
20 TABLET ORAL DAILY
COMMUNITY
Start: 2019-05-21 | End: 2021-05-13 | Stop reason: ALTCHOICE

## 2019-05-22 RX ORDER — AMLODIPINE BESYLATE 5 MG/1
5 TABLET ORAL
COMMUNITY
Start: 2019-05-21

## 2019-05-22 NOTE — PROGRESS NOTES
[facility]  [address], [address]  Phone: [phone]  Fax: [phone]        PROGRESS NOTE      HISTORY OF PRESENT ILLNESS:  The patient is a 60 y.o. male and was seen today for follow up of chronic low back and left buttock pain extending in an S1 distribution to the calf. He states pain is exacerbated when bending. Pt reports he was doing fairly well until he fell off of a ladder on June 25, 2015. He has subsequently been seen in the emergency room at [facility]. Notes are reviewed. According to the patient, he had a CT scan, which is nondiagnostic. The previous fall was apparently covered under Workers' Compensation however, we have no record of this. He was seen by Cris Schmid NP on 10/13/16 and stated the right-sided L4-L5 facet joint blocks performed on 9/29/15 provided two days of relief. Pt has previously been treated with MDP. He has been taking Neurontin 800 mg TID with significant relief. Pt uses cryotherapy for flare ups occasionally with benefit. Lumbar spine MRI dated 4/2/13 per report revealed at L4-L5 there was a shallow posterior disc protrusion with annular fissure. There was moderate bilateral neuroforaminal stenosis. At L5-S1, there was a shallow posterior disc protrusion and moderate bilateral neuroforaminal stenosis. Preliminary reading of lumbar spine 2 to 3 views revealed paper clip markings over right L4-L5 facet, mild degenerative changes, no acute pathology identified. At his last clinical appointment, patient wished to continue his current treatment. I provided him with refills of his Neurontin 800 mg TID. I recommended he increase the frequency of his HEP to daily. The patient returns today with pain location and distribution remain unchanged. He rates his pain 3/10, previously 0-1/10. Pt reports he has been exercising frequently, which may account for his increase in pain.  He is complaint with Neurontin 800 mg TID. Pt is performing his HEP daily. Pt denies change in bowel or bladder habits.  reviewed. Body mass index is 31.82 kg/m². PCP: Avery Huff MD      Past Medical History:   Diagnosis Date    Chronic headaches     SINCE STROKE    GERD (gastroesophageal reflux disease)     Herniated disc     History of fall 06-25-15    Celina Adithya off of a ladder    Hypercholesterolemia     Hypertension     Lung nodules     Migraine     Neuroforaminal stenosis of spine     Prostate cancer ([facility] [address] 05/10/2013    [id number] [place] Grade 3+4 Adenocarcinoma of the Prostate in 1/12 cores, prostate volume of 30 cm3, Pre-biopsy PSA= 9.2    Unspecified cerebral artery occlusion with cerebral infarction 10/11/2010        Social History     Socioeconomic History    Marital status:      Spouse name: Not on file    Number of children: Not on file    Years of education: Not on file    Highest education level: Not on file   Occupational History    Not on file   Social Needs    Financial resource strain: Not on file    Food insecurity:     Worry: Not on file     Inability: Not on file    Transportation needs:     Medical: Not on file     Non-medical: Not on file   Tobacco Use    Smoking status: Never Smoker    Smokeless tobacco: Never Used   Substance and Sexual Activity    Alcohol use:  Yes     Alcohol/week: 0.0 oz     Comment: VERY SELDOM    Drug use: No    Sexual activity: Not on file   Lifestyle    Physical activity:     Days per week: Not on file     Minutes per session: Not on file    Stress: Not on file   Relationships    Social connections:     Talks on phone: Not on file     Gets together: Not on file     Attends Mormonism service: Not on file     Active member of club or organization: Not on file     Attends meetings of clubs or organizations: Not on file     Relationship status: Not on file    Intimate partner violence:     Fear of current or ex partner: Not on file Emotionally abused: Not on file     Physically abused: Not on file     Forced sexual activity: Not on file   Other Topics Concern    Not on file   Social History Narrative    Not on file       Current Outpatient Medications   Medication Sig Dispense Refill    amLODIPine (NORVASC) 5 mg tablet Take 5 mg by mouth daily.  lisinopril (PRINIVIL, ZESTRIL) 20 mg tablet Take 20 mg by mouth daily.  valACYclovir (VALTREX) 500 mg tablet valacyclovir 500 mg tablet   one tablet daily      topiramate (TOPAMAX) 50 mg tablet 1tab daily x1st wk, then 1tab BID thereafter      sildenafil citrate (VIAGRA) 50 mg tablet Generic. Take 1 tablet by mouth PRN 1 hour prior to sexual activity. MAX: 1 tablet daily.  metoprolol succinate (TOPROL XL) 25 mg XL tablet Take 25 mg by mouth.  gabapentin (NEURONTIN) 800 mg tablet Take 1 Tab by mouth three (3) times daily (with meals). 90 Tab 1    atorvastatin (LIPITOR) 10 mg tablet Take  by mouth daily.  lansoprazole (PREVACID) 15 mg capsule Take  by mouth Daily (before breakfast).  nebivolol (BYSTOLIC) 5 mg tablet Take  by mouth daily.  CIALIS 5 mg tablet Take one tablet daily as needed for ED 90 Tab 3    tadalafil (CIALIS) 20 mg tablet Take 1 Tab by mouth daily as needed. 6 Tab 0       Allergies   Allergen Reactions    Metoprolol Succinate Other (comments)    Tizanidine Rash          PHYSICAL EXAMINATION    Visit Vitals  /74   Pulse (!) 47   Resp 14   Ht 6' 2\" (1.88 m)   Wt 247 lb 12.8 oz (112.4 kg)   SpO2 100%   BMI 31.82 kg/m²       CONSTITUTIONAL: NAD, A&O x 3  SENSATION: Intact to light touch throughout  RANGE OF MOTION: The patient has full passive range of motion in all four extremities.   MOTOR:  Straight Leg Raise: Negative, bilateral               Hip Flex Knee Ext Knee Flex Ankle DF GTE Ankle PF Tone   Right +4/5 +4/5 +4/5 +4/5 +4/5 +4/5 +4/5   Left +4/5 +4/5 +4/5 +4/5 +4/5 +4/5 +4/5       ASSESSMENT   Diagnoses and all orders for this visit: 1. Radiculopathy, lumbar region    2. Other intervertebral disc displacement, lumbar region        IMPRESSION AND PLAN:  Patient should attempt to decrease his dose of Neurontin to 600 mg TID as tolerated. I recommend he continue to perform his HEP daily. Patient is neurologically intact. I will see the patient back in 6 month's time or earlier if needed. Written by Avelino Washington, as dictated by Acacia Hernandez MD  I examined the patient, reviewed and agree with the note.

## 2019-05-22 NOTE — PROGRESS NOTES
Leighann Taylor presents today for   Chief Complaint   Patient presents with    Back Pain     lower    Follow-up       Is someone accompanying this pt? NO    Is the patient using any DME equipment during OV? NO    Depression Screening:  3 most recent PHQ Screens 5/22/2019   Little interest or pleasure in doing things Not at all   Feeling down, depressed, irritable, or hopeless Not at all   Total Score PHQ 2 0       Learning Assessment:  Learning Assessment 5/22/2019   PRIMARY LEARNER Patient   HIGHEST LEVEL OF EDUCATION - PRIMARY LEARNER  SOME COLLEGE   BARRIERS PRIMARY LEARNER VISUAL   CO-LEARNER CAREGIVER No   PRIMARY LANGUAGE ENGLISH   LEARNER PREFERENCE PRIMARY DEMONSTRATION   ANSWERED BY Patient   RELATIONSHIP SELF       Abuse Screening:  No flowsheet data found. OPIOID RISK TOOL  No flowsheet data found. Pt currently taking Antiplatelet therapy? NO    Coordination of Care:  1. Have you been to the ER, urgent care clinic since your last visit? NO  Hospitalized since your last visit? NO    2. Have you seen or consulted any other health care providers outside of the [address] [place] [address] since your last visit? Yes, Dr. Powell Locus any pap smears or colon screening.        Last  Checked 05/22/19

## 2019-05-22 NOTE — LETTER
5/22/19 Patient: Radha Barone YOB: 1960 Date of Visit: 5/22/2019 Roberto Carroll, [address] [facility] [address] [address] VIA Facsimile: [phone] Dear Roberto Carroll MD, Thank you for referring Mr. Ros Ramirez to [address] [place] [address] for evaluation. My notes for this consultation are attached. If you have questions, please do not hesitate to call me. I look forward to following your patient along with you. Sincerely, Florida Schwartz MD

## 2019-12-11 ENCOUNTER — OFFICE VISIT (OUTPATIENT)
Dept: ORTHOPEDIC SURGERY | Age: 59
End: 2019-12-11

## 2019-12-11 VITALS
BODY MASS INDEX: 29.54 KG/M2 | HEART RATE: 69 BPM | DIASTOLIC BLOOD PRESSURE: 70 MMHG | RESPIRATION RATE: 12 BRPM | HEIGHT: 74 IN | OXYGEN SATURATION: 100 % | WEIGHT: 230.2 LBS | SYSTOLIC BLOOD PRESSURE: 136 MMHG

## 2019-12-11 DIAGNOSIS — M51.26 LUMBAR HERNIATED DISC: ICD-10-CM

## 2019-12-11 DIAGNOSIS — M54.16 RADICULOPATHY, LUMBAR REGION: Primary | ICD-10-CM

## 2019-12-11 RX ORDER — PANTOPRAZOLE SODIUM 40 MG/1
40 TABLET, DELAYED RELEASE ORAL DAILY
Refills: 5 | COMMUNITY
Start: 2019-12-08

## 2019-12-11 RX ORDER — GABAPENTIN 600 MG/1
600 TABLET ORAL 3 TIMES DAILY
Qty: 270 TAB | Refills: 1 | Status: SHIPPED | OUTPATIENT
Start: 2019-12-11 | End: 2020-06-22 | Stop reason: SDUPTHER

## 2019-12-11 NOTE — LETTER
12/11/19 Patient: Aura Calle YOB: 1960 Date of Visit: 12/11/2019 Tatiana Silva, [address] [facility] [address] [facility] [address] VIA Facsimile: [phone] Dear Tatiana Silva MD, Thank you for referring Mr. Kem Guerrier to [address] for evaluation. My notes for this consultation are attached. If you have questions, please do not hesitate to call me. I look forward to following your patient along with you. Sincerely, Karen Dhillon MD

## 2019-12-11 NOTE — PROGRESS NOTES
[facility]  [address], [address]  Phone: [phone]  Fax: [phone]        PROGRESS NOTE      HISTORY OF PRESENT ILLNESS:  The patient is a 60 y.o. male and was seen today for follow up of chronic low back and left buttock pain extending in an S1 distribution to the calf. He states pain is exacerbated when bending. Pt reports he was doing fairly well until he fell off of a ladder on June 25, 2015. He has subsequently been seen in the emergency room at [facility]. Notes are reviewed. According to the patient, he had a CT scan, which is nondiagnostic. The previous fall was apparently covered under Workers' Compensation however, we have no record of this. He was seen by Shira Simms NP on 10/13/16 and stated the right-sided L4-L5 facet joint blocks performed on 9/29/15 provided two days of relief. Pt has previously been treated with MDP. He has been taking Neurontin 800 mg TID with significant relief. Pt uses cryotherapy for flare ups occasionally with benefit. Lumbar spine MRI dated 4/2/13 per report revealed at L4-L5 there was a shallow posterior disc protrusion with annular fissure. There was moderate bilateral neuroforaminal stenosis. At L5-S1, there was a shallow posterior disc protrusion and moderate bilateral neuroforaminal stenosis. Preliminary reading of lumbar spine 2 to 3 views revealed paper clip markings over right L4-L5 facet, mild degenerative changes, no acute pathology identified. At his last clinical appointment, patient instructed to attempt to decrease his dose of Neurontin to 600 mg TID as tolerated. I recommended he continue to perform his HEP daily. The patient returns today with increase in low back pain extending into the RLE following exacerbation at the gym x 2 weeks. He rates his pain 8/10. He reports this pain is improving now. Last seen by me 5/22/19. Pain at that time was 3/10.  Scheduled to f/u in 6 months. Pt decreased his dose of Neurontin to 600 mg TID with no increase in pain. He is performing his HEP daily. Pt denies change in bowel or bladder habits. ER note from Dr. Melisa Abarca dated 8/11/19 indicating patient presented for chest pain. Cardiac enzymes negative. Per pt, they found chest pain was due to acid reflux.  reviewed. Body mass index is 29.56 kg/m². PCP: Rossy Morales MD      Past Medical History:   Diagnosis Date    Chronic headaches     SINCE STROKE    GERD (gastroesophageal reflux disease)     Herniated disc     History of fall 06-25-15    Luvenia Lash off of a ladder    Hypercholesterolemia     Hypertension     Lung nodules     Migraine     Neuroforaminal stenosis of spine     Prostate cancer ([facility] 05/10/2013    [id number] Cheryl Grade 3+4 Adenocarcinoma of the Prostate in 1/12 cores, prostate volume of 30 cm3, Pre-biopsy PSA= 9.2    Unspecified cerebral artery occlusion with cerebral infarction 10/11/2010        Social History     Socioeconomic History    Marital status:      Spouse name: Not on file    Number of children: Not on file    Years of education: Not on file    Highest education level: Not on file   Occupational History    Not on file   Social Needs    Financial resource strain: Not on file    Food insecurity:     Worry: Not on file     Inability: Not on file    Transportation needs:     Medical: Not on file     Non-medical: Not on file   Tobacco Use    Smoking status: Never Smoker    Smokeless tobacco: Never Used   Substance and Sexual Activity    Alcohol use:  Yes     Alcohol/week: 0.0 standard drinks     Comment: VERY SELDOM    Drug use: No    Sexual activity: Not on file   Lifestyle    Physical activity:     Days per week: Not on file     Minutes per session: Not on file    Stress: Not on file   Relationships    Social connections:     Talks on phone: Not on file     Gets together: Not on file     Attends Yarsani service: Not on file     Active member of club or organization: Not on file     Attends meetings of clubs or organizations: Not on file     Relationship status: Not on file    Intimate partner violence:     Fear of current or ex partner: Not on file     Emotionally abused: Not on file     Physically abused: Not on file     Forced sexual activity: Not on file   Other Topics Concern    Not on file   Social History Narrative    Not on file       Current Outpatient Medications   Medication Sig Dispense Refill    pantoprazole (PROTONIX) 40 mg tablet Take 1 Tab by mouth daily. 5    gabapentin (NEURONTIN) 600 mg tablet TAKE 1 TABLET BY MOUTH THREE TIMES DAILY 90 Tab 1    amLODIPine (NORVASC) 5 mg tablet Take 5 mg by mouth daily.  lisinopril (PRINIVIL, ZESTRIL) 20 mg tablet Take 20 mg by mouth daily.  valACYclovir (VALTREX) 500 mg tablet valacyclovir 500 mg tablet   one tablet daily      topiramate (TOPAMAX) 50 mg tablet 1tab daily x1st wk, then 1tab BID thereafter      sildenafil citrate (VIAGRA) 50 mg tablet Generic. Take 1 tablet by mouth PRN 1 hour prior to sexual activity. MAX: 1 tablet daily.  metoprolol succinate (TOPROL XL) 25 mg XL tablet Take 25 mg by mouth daily.  atorvastatin (LIPITOR) 10 mg tablet Take 10 mg by mouth daily.  lansoprazole (PREVACID) 15 mg capsule Take 15 mg by mouth Daily (before breakfast).  nebivolol (BYSTOLIC) 5 mg tablet Take 5 mg by mouth daily.  gabapentin (NEURONTIN) 800 mg tablet Take 1 Tab by mouth three (3) times daily (with meals). (Patient not taking: Reported on 12/11/2019) 90 Tab 1    CIALIS 5 mg tablet Take one tablet daily as needed for ED 90 Tab 3    tadalafil (CIALIS) 20 mg tablet Take 1 Tab by mouth daily as needed.  6 Tab 0       Allergies   Allergen Reactions    Metoprolol Succinate Other (comments)    Tizanidine Rash          PHYSICAL EXAMINATION    Visit Vitals  /70   Pulse 69   Resp 12   Ht 6' 2\" (1.88 m)   Wt 230 lb 3.2 oz (104.4 kg) SpO2 100%   BMI 29.56 kg/m²       CONSTITUTIONAL: NAD, A&O x 3  SENSATION: Intact to light touch throughout  RANGE OF MOTION: The patient has full passive range of motion in all four extremities. MOTOR:  Straight Leg Raise: Negative, bilateral               Hip Flex Knee Ext Knee Flex Ankle DF GTE Ankle PF Tone   Right +4/5 +4/5 +4/5 +4/5 +4/5 +4/5 +4/5   Left +4/5 +4/5 +4/5 +4/5 +4/5 +4/5 +4/5       ASSESSMENT   Diagnoses and all orders for this visit:    1. Radiculopathy, lumbar region    2. Lumbar herniated disc          IMPRESSION AND PLAN:  Patient wished to continue his current treatment. I provided him with refills of Neurontin 600 mg TID. I recommend he continue to perform his HEP daily. Patient is neurologically intact. I will see the patient back in 6 month's time or earlier if needed. Written by Ga Tejeda, as dictated by Jasson Acosta MD  I examined the patient, reviewed and agree with the note.

## 2020-06-19 NOTE — PROGRESS NOTES
[facility]  [address], [address]  Phone: [phone]  Fax: [phone]        PROGRESS NOTE    CONSENT:  Pursuant to the emergency declaration under the [address] and [facility] [address] waiver authority and the [email or web address] and [employer] Act, this Virtual Visit was conducted, with patient's consent, to reduce the patient's risk of exposure to COVID-19 and provide continuity of care for an established patient. Services were unable to be provided through a video synchronous discussion virtually to substitute for in-person appointment. Subsequently, the patient was consulted through a telephone discussion. ENCOUNTER DURATION: 10 minutes 26 seconds      HISTORY OF PRESENT ILLNESS:  The patient is a 60 y.o. male and is being consulted via telephone at the [facility] office for follow up of increase in low back pain extending into the RLE following exacerbation at the gym x 2 weeks. Previously, he was seen for chronic low back and left buttock pain radiating in an S1 distribution to the calf. He states pain is exacerbated when bending. Pt reports he was doing fairly well until he fell off of a ladder on June 25, 2015. He has subsequently been seen in the emergency room at [facility]. Notes are reviewed. According to the patient, he had a CT scan, which is nondiagnostic. The previous fall was apparently covered under Workers' Compensation however, we have no record of this. He was seen by Froylan Rivera NP on 10/13/16 and stated the right-sided L4-L5 facet joint blocks performed on 9/29/15 provided two days of relief. Pt has previously been treated with MDP. He was taking Neurontin 800 mg TID with significant relief. He decreased to Neurontin 600 mg TID without an increase in pain. He performs his HEP daily. Pt uses cryotherapy for flare ups occasionally with benefit.  ER note from Dr. Haily Bautista dated 8/11/19 indicating patient presented for chest pain. Cardiac enzymes negative. Per pt, they found chest pain was due to acid reflux. Lumbar spine MRI dated 4/2/13 per report revealed at L4-L5 there was a shallow posterior disc protrusion with annular fissure. There was moderate bilateral neuroforaminal stenosis. At L5-S1, there was a shallow posterior disc protrusion and moderate bilateral neuroforaminal stenosis. Preliminary reading of lumbar spine 2 to 3 views revealed paper clip markings over right L4-L5 facet, mild degenerative changes, no acute pathology identified. At his last clinical appointment, patient wished to continue his current treatment. I provided him with refills of Neurontin 600 mg TID. I recommended he continue to perform his HEP daily.         At today's telephone consultation, the patient c/o centralized low back pain. He rates his pain 2/10, previously 8/10. He reports improvement of radicular symptoms. He is compliant with Neurontin 600 mg TID. Pt denies change in bowel or bladder habits. Pt denies any signs of weakness.  reviewed. There is no height or weight on file to calculate BMI.     PCP: Rebecca Guerra MD      Past Medical History:   Diagnosis Date    Chronic headaches     SINCE STROKE    GERD (gastroesophageal reflux disease)     Herniated disc     History of fall 06-25-15    Ardia M[place] off of a ladder    Hypercholesterolemia     Hypertension     Lung nodules     Migraine     Neuroforaminal stenosis of spine     Prostate cancer ([facility] 05/10/2013    [id number] Cheryl Grade 3+4 Adenocarcinoma of the Prostate in 1/12 cores, prostate volume of 30 cm3, Pre-biopsy PSA= 9.2    Unspecified cerebral artery occlusion with cerebral infarction 10/11/2010        Social History     Socioeconomic History    Marital status:      Spouse name: Not on file    Number of children: Not on file    Years of education: Not on file    Highest education level: Not on file   Occupational History    Not on file   Social Needs    Financial resource strain: Not on file    Food insecurity     Worry: Not on file     Inability: Not on file    Transportation needs     Medical: Not on file     Non-medical: Not on file   Tobacco Use    Smoking status: Never Smoker    Smokeless tobacco: Never Used   Substance and Sexual Activity    Alcohol use: Yes     Alcohol/week: 0.0 standard drinks     Comment: VERY SELDOM    Drug use: No    Sexual activity: Not on file   Lifestyle    Physical activity     Days per week: Not on file     Minutes per session: Not on file    Stress: Not on file   Relationships    Social connections     Talks on phone: Not on file     Gets together: Not on file     Attends Hindu service: Not on file     Active member of club or organization: Not on file     Attends meetings of clubs or organizations: Not on file     Relationship status: Not on file    Intimate partner violence     Fear of current or ex partner: Not on file     Emotionally abused: Not on file     Physically abused: Not on file     Forced sexual activity: Not on file   Other Topics Concern    Not on file   Social History Narrative    Not on file       Current Outpatient Medications   Medication Sig Dispense Refill    pantoprazole (PROTONIX) 40 mg tablet Take 40 mg by mouth daily. 5    gabapentin (NEURONTIN) 600 mg tablet Take 1 Tab by mouth three (3) times daily. Max Daily Amount: 1,800 mg. 270 Tab 1    amLODIPine (NORVASC) 5 mg tablet Take 5 mg by mouth daily.  lisinopril (PRINIVIL, ZESTRIL) 20 mg tablet Take 20 mg by mouth daily.  valACYclovir (VALTREX) 500 mg tablet Take 250 mg by mouth daily.  atorvastatin (LIPITOR) 10 mg tablet Take 10 mg by mouth daily.  lansoprazole (PREVACID) 15 mg capsule Take 15 mg by mouth Daily (before breakfast).  metoprolol succinate (TOPROL XL) 25 mg XL tablet Take 25 mg by mouth daily.       nebivolol (BYSTOLIC) 5 mg tablet Take 5 mg by mouth daily. Allergies   Allergen Reactions    Metoprolol Succinate Other (comments)    Tizanidine Rash          PHYSICAL EXAMINATION  Unable to perform examination secondary to COVID-19. CONSTITUTIONAL: NAD, A&O x 3    ASSESSMENT   Diagnoses and all orders for this visit:    1. Lumbar herniated disc    2. Radiculopathy, lumbar region  -     gabapentin (NEURONTIN) 600 mg tablet; Take 1 Tab by mouth three (3) times daily. Max Daily Amount: 1,800 mg. IMPRESSION AND PLAN:  The patient consented to the tele health visit and was aware that there would be a charge. During today's telephone consultation patient had c/o centralized low back pain. Multiple treatment options were discussed. Pt reports doing well. He was given instructions to slowly decrease his Neurontin by one half tablet per month as tolerated. I provided him refills of 600 mg TID. Pt appears to be neurologically intact. I will see the patient back in 6 month's time or earlier if needed. Written by Shivam Marmolejo, as dictated by Evonne Dejesus MD  I examined the patient, reviewed and agree with the note.

## 2020-06-22 ENCOUNTER — VIRTUAL VISIT (OUTPATIENT)
Dept: ORTHOPEDIC SURGERY | Age: 60
End: 2020-06-22

## 2020-06-22 DIAGNOSIS — M54.16 RADICULOPATHY, LUMBAR REGION: ICD-10-CM

## 2020-06-22 DIAGNOSIS — M51.26 LUMBAR HERNIATED DISC: Primary | ICD-10-CM

## 2020-06-22 RX ORDER — GABAPENTIN 600 MG/1
600 TABLET ORAL 3 TIMES DAILY
Qty: 270 TAB | Refills: 1 | Status: SHIPPED | OUTPATIENT
Start: 2020-06-22 | End: 2020-12-23 | Stop reason: SDUPTHER

## 2020-12-17 DIAGNOSIS — M54.16 RADICULOPATHY, LUMBAR REGION: ICD-10-CM

## 2020-12-17 RX ORDER — GABAPENTIN 600 MG/1
TABLET ORAL
Qty: 270 TAB | OUTPATIENT
Start: 2020-12-17

## 2020-12-23 ENCOUNTER — VIRTUAL VISIT (OUTPATIENT)
Dept: ORTHOPEDIC SURGERY | Age: 60
End: 2020-12-23
Payer: COMMERCIAL

## 2020-12-23 DIAGNOSIS — M54.41 CHRONIC BILATERAL LOW BACK PAIN WITH RIGHT-SIDED SCIATICA: Primary | ICD-10-CM

## 2020-12-23 DIAGNOSIS — M54.16 RADICULOPATHY, LUMBAR REGION: ICD-10-CM

## 2020-12-23 DIAGNOSIS — G89.29 CHRONIC BILATERAL LOW BACK PAIN WITH RIGHT-SIDED SCIATICA: Primary | ICD-10-CM

## 2020-12-23 PROCEDURE — 99213 OFFICE O/P EST LOW 20 MIN: CPT | Performed by: NURSE PRACTITIONER

## 2020-12-23 RX ORDER — GABAPENTIN 600 MG/1
600 TABLET ORAL 3 TIMES DAILY
Qty: 270 TAB | Refills: 1 | Status: SHIPPED | OUTPATIENT
Start: 2020-12-23 | End: 2021-12-02 | Stop reason: SDUPTHER

## 2020-12-23 NOTE — PROGRESS NOTES
History of Present Illness:    Consent: Perla Hoyos, who was seen by synchronous (real-time) audio-video technology, and/or his healthcare decision maker, is aware that this patient-initiated, Telehealth encounter on 12/23/2020 is a billable service, with coverage as determined by his insurance carrier. He is aware that he may receive a bill and has provided verbal consent to proceed: Yes. The provider was located at [facility] and the patient was located at their residence. No one else participated in the visit with the patient. The platform used was Doxy. me    Patient was evaluated today for follow-up of his low back pain with radiation into his right buttock. He states he has been doing okay but about couple weeks ago he was running and doing his regular exercising when he felt a pop in his back and he had a flare of pain for about a week. He did stretching and rest and it did resolve on its own. He states now he has the back pain that is chronic and it goes into his right buttock. Gabapentin 600 mg three times a day does help but he would need a refill of that. 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 He denies any new medical issues. He denies fever bowel bladder dysfunction. Physical Exam: The patient is a 69-year-old male well-developed well-nourished who is alert and oriented with a normal mood and affect. Vital Signs: (As obtained by patient/caregiver at home)  There were no vitals taken for this visit.      [INSTRUCTIONS:  \"[x]\" Indicates a positive item  \"[]\" Indicates a negative item  -- DELETE ALL ITEMS NOT EXAMINED]    Constitutional: [x] Appears well-developed and well-nourished [x] No apparent distress      [] Abnormal -     Mental status: [x] Alert and awake  [x] Oriented to person/place/time [x] Able to follow commands    [] Abnormal -     Eyes:   EOM    [x]  Normal    [] Abnormal -   Sclera  [x]  Normal    [] Abnormal -          Discharge [x]  None visible   [] Abnormal -     HENT: [x] Normocephalic, atraumatic  [] Abnormal -   [x] Mouth/Throat: Mucous membranes are moist    External Ears [x] Normal  [] Abnormal -    Neck: [x] No visualized mass [] Abnormal -     Pulmonary/Chest: [x] Respiratory effort normal   [x] No visualized signs of difficulty breathing or respiratory distress        [] Abnormal -      Musculoskeletal:   [x] Normal gait with no signs of ataxia         [x] Normal range of motion of neck        [] Abnormal -     Neurological:        [x] No Facial Asymmetry (Cranial nerve 7 motor function) (limited exam due to video visit)          [x] No gaze palsy        [] Abnormal -          Skin:        [x] No significant exanthematous lesions or discoloration noted on facial skin         [] Abnormal -            Psychiatric:       [x] Normal Affect [] Abnormal -        [x] No Hallucinations      Assessment & Plan: This is a patient who has low back pain with radiating right buttock pain. He had a flare of pain a couple weeks ago that did resolve on its own. He does well with Neurontin 600 mg three times a day. I have sent that to his pharmacy. He will continue his exercise regimen we will see him back in 6 months sooner if needed. Diagnoses and all orders for this visit:    1. Chronic bilateral low back pain with right-sided sciatica  -     gabapentin (NEURONTIN) 600 mg tablet; Take 1 Tab by mouth three (3) times daily. Max Daily Amount: 1,800 mg.    2. Radiculopathy, lumbar region  -     gabapentin (NEURONTIN) 600 mg tablet; Take 1 Tab by mouth three (3) times daily. Max Daily Amount: 1,800 mg. Pain Scale: /10         has been reviewed and is appropriate    Pt has a consistent UDS in the record        We discussed the expected course, resolution and complications of the diagnosis(es) in detail. Medication risks, benefits, costs, interactions, and alternatives were discussed as indicated.   I advised him to contact the office if his condition worsens, changes or fails to improve as anticipated. For emergencies or worsening neurological symptoms the patient was instructed to call 911. He expressed understanding with the diagnosis(es) and plan. Follow-up and Dispositions    · Return in about 6 months (around 6/23/2021). Sy Dash is a 63 y.o. male being evaluated by a video visit encounter for concerns as above. A caregiver was present when appropriate. Due to this being a TeleHealth encounter (During [facility] public [facility] emergency), evaluation of the following organ systems was limited: Vitals/Constitutional/EENT/Resp/CV/GI//MS/Neuro/Skin/Heme-Lymph-Imm. Pursuant to the emergency declaration under the [address] [place] [address] authority and the [employer] and [employer] Act, this Virtual  Visit was conducted, with patient's (and/or legal guardian's) consent, to reduce the patient's risk of exposure to COVID-19 and provide necessary medical care. CPT Codes 81303-13616 for Established Patients may apply to this Telehealth Visit  Time-based coding, delete if not needed: I spent at least 10 minutes with this established patient, and >50% of the time was spent counseling and/or coordinating care regarding His back and  buttock pain  Start time: 11:35 AM and Stop time: 11:47 AM.        Due to this being a TeleHealth evaluation, many elements of the physical examination are unable to be assessed. Pursuant to the emergency declaration under the [address] [place] [address], [address] [place] [address] authority and the [employer] and [employer] Act, this Virtual  Visit was conducted, with patient's consent, to reduce the patient's risk of exposure to COVID-19 and provide continuity of care for an established patient.      Services were provided through a video synchronous discussion virtually to substitute for in-person clinic visit.     Evy Cristina, NP

## 2021-05-12 PROBLEM — S93.602A SPRAIN OF LEFT FOOT: Status: ACTIVE | Noted: 2018-05-28

## 2021-05-12 PROBLEM — R10.32 LEFT LOWER QUADRANT PAIN: Status: ACTIVE | Noted: 2018-01-18

## 2021-05-12 PROBLEM — M79.672 PAIN IN LEFT FOOT: Status: ACTIVE | Noted: 2018-05-28

## 2021-05-12 PROBLEM — E66.9 OBESITY (BMI 30.0-34.9): Status: ACTIVE | Noted: 2018-05-28

## 2021-05-12 PROBLEM — M54.50 LOW BACK PAIN: Status: ACTIVE | Noted: 2021-05-12

## 2021-06-10 NOTE — PROGRESS NOTES
[facility]  [address], [place]   Phone: [phone]  Fax: [phone]        PROGRESS NOTE      HISTORY OF PRESENT ILLNESS:  The patient is a 63 y.o. male and was seen today for follow up of centralized low back pain. Previously, he was seen for increase in low back pain extending into the RLE following exacerbation at the gym x 2 weeks. Previously, he was seen for chronic low back and left buttock pain radiating in an S1 distribution to the calf. He states pain is exacerbated when bending. Pt reports he was doing fairly well until he fell off of a ladder on June 25, 2015. He has subsequently been seen in the emergency room at [facility]. Notes are reviewed. According to the patient, he had a CT scan, which is nondiagnostic. The previous fall was apparently covered under Workers' Compensation however, we have no record of this. He was seen by Zaida Orr NP on 10/13/16 and stated the right-sided L4-L5 facet joint blocks performed on 9/29/15 provided two days of relief. Pt has previously been treated with MDP. He was taking Neurontin 800 mg TID with significant relief. He decreased to Neurontin 600 mg TID without an increase in pain. He performs his HEP daily. Pt uses cryotherapy for flare ups occasionally with benefit. ER note from Dr. El Morrison 8/11/19 indicating patient presented for chest pain. Cardiac enzymes negative. Per pt, they found chest pain was due to acid reflux. Lumbar spine MRI dated 4/2/13 per report revealed at L4-L5 there was a shallow posterior disc protrusion with annular fissure. There was moderate bilateral neuroforaminal stenosis. At L5-S1, there was a shallow posterior disc protrusion and moderate bilateral neuroforaminal stenosis. Preliminary reading of lumbar spine 2 to 3 views revealed paper clip markings over right L4-L5 facet, mild degenerative changes, no acute pathology identified.  At his last clinical appointment, pt reported doing well. He was given instructions to slowly decrease his Neurontin by one half tablet per month as tolerated. I provided him refills of 600 mg TID.       The patient returns today with centralized low back pain. He rates his pain 0-3/10, previously 2/10. Pt attempted to decrease his Neurontin and had an increase in pain. He continues on Neurontin 600 mg TID. Pt denies change in bowel or bladder habits. Pt has hernia repair surgery on 6/23/2021. Note from Светлана Avery NP dated 12/23/2020 indicating patient was seen with c/o low back and right buttocks pain. A couple weeks ago pt was running and felt a pop in his back. His flare up lasted about 1 week. He continues on Neurontin 600 mg TID.  reviewed. Body mass index is 29.79 kg/m². PCP: Nicole Quintero MD      Past Medical History:   Diagnosis Date    Chronic headaches     SINCE STROKE    GERD (gastroesophageal reflux disease)     Herniated disc     History of fall 06-25-15    Chula Simmonds off of a ladder    Hypercholesterolemia     Hypertension     Lung nodules     Migraine     Neuroforaminal stenosis of spine     Other abnormality of red blood cells     hernia    Prostate cancer ([facility] [address] 05/10/2013    [id number] [place] Grade 3+4 Adenocarcinoma of the Prostate in 1/12 cores, prostate volume of 30 cm3, Pre-biopsy PSA= 9.2    Unspecified cerebral artery occlusion with cerebral infarction 10/11/2010        Social History     Socioeconomic History    Marital status:      Spouse name: Not on file    Number of children: Not on file    Years of education: Not on file    Highest education level: Not on file   Occupational History    Not on file   Tobacco Use    Smoking status: Never Smoker    Smokeless tobacco: Never Used   Substance and Sexual Activity    Alcohol use:  Yes     Alcohol/week: 0.0 standard drinks     Comment: VERY SELDOM    Drug use: No    Sexual activity: Not on file   Other Topics Concern    Not on file   Social History Narrative    Not on file     Social Determinants of Health     Financial Resource Strain:     Difficulty of Paying Living Expenses:    Food Insecurity:     Worried About Running Out of Food in the Last Year:     [address] in the Last Year:    Transportation Needs:     Lack of Transportation (Medical):  Lack of Transportation (Non-Medical):    Physical Activity:     Days of Exercise per Week:     Minutes of Exercise per Session:    Stress:     Feeling of Stress :    Social Connections:     Frequency of Communication with Friends and Family:     Frequency of Social Gatherings with Friends and Family:     Attends [employer] Services:     Active Member of Clubs or Organizations:     Attends Club or Organization Meetings:     Marital Status:    Intimate Partner Violence:     Fear of Current or Ex-Partner:     Emotionally Abused:     Physically Abused:     Sexually Abused:        Current Outpatient Medications   Medication Sig Dispense Refill    gabapentin (NEURONTIN) 600 mg tablet Take 1 Tab by mouth three (3) times daily. Max Daily Amount: 1,800 mg. 270 Tab 1    pantoprazole (PROTONIX) 40 mg tablet Take 40 mg by mouth daily. 5    amLODIPine (NORVASC) 5 mg tablet Take 5 mg by mouth daily.  atorvastatin (LIPITOR) 10 mg tablet Take 10 mg by mouth daily.  metoprolol succinate (TOPROL XL) 25 mg XL tablet Take 25 mg by mouth daily. (Patient not taking: Reported on 6/11/2021)      nebivolol (BYSTOLIC) 5 mg tablet Take 5 mg by mouth daily.  (Patient not taking: Reported on 6/11/2021)         Allergies   Allergen Reactions    Metoprolol Succinate Other (comments)    Tizanidine Rash          PHYSICAL EXAMINATION    Visit Vitals  BP (!) 148/84 (BP 1 Location: Left upper arm)   Pulse (!) 49   Temp 98.3 °F (36.8 °C)   Resp 18   Wt 232 lb (105.2 kg)   SpO2 100%   BMI 29.79 kg/m²       CONSTITUTIONAL: NAD, A&O x 3  SENSATION: Intact to light touch throughout  RANGE OF MOTION: The patient has full passive range of motion in all four extremities. MOTOR:  Straight Leg Raise: Negative, bilateral                 Hip Flex Knee Ext Knee Flex Ankle DF GTE Ankle PF Tone   Right +4/5 +4/5 +4/5 +4/5 +4/5 +4/5 +4/5   Left +4/5 +4/5 +4/5 +4/5 +4/5 +4/5 +4/5       ASSESSMENT   Diagnoses and all orders for this visit:    1. Lumbar herniated disc  -     gabapentin (Neurontin) 600 mg tablet; Take 1 Tablet by mouth three (3) times daily. Max Daily Amount: 1,800 mg.    2. Radiculopathy, lumbar region  -     gabapentin (Neurontin) 600 mg tablet; Take 1 Tablet by mouth three (3) times daily. Max Daily Amount: 1,800 mg. IMPRESSION AND PLAN:  Patient returns to the office today with c/o centralized low back pain. Multiple treatment options were discussed. Patient wished to continue his current treatment. I provided him refills of Neurontin 600 mg TID. Patient is neurologically intact. I will see the patient back in 6 month's time or earlier if needed. Written by Deepak Barboza, as dictated by Pavel Garrett MD  I examined the patient, reviewed and agree with the note.

## 2021-06-11 ENCOUNTER — OFFICE VISIT (OUTPATIENT)
Dept: ORTHOPEDIC SURGERY | Age: 61
End: 2021-06-11
Payer: COMMERCIAL

## 2021-06-11 VITALS
OXYGEN SATURATION: 100 % | BODY MASS INDEX: 29.79 KG/M2 | SYSTOLIC BLOOD PRESSURE: 148 MMHG | RESPIRATION RATE: 18 BRPM | HEART RATE: 49 BPM | DIASTOLIC BLOOD PRESSURE: 84 MMHG | WEIGHT: 232 LBS | TEMPERATURE: 98.3 F

## 2021-06-11 DIAGNOSIS — M54.16 RADICULOPATHY, LUMBAR REGION: ICD-10-CM

## 2021-06-11 DIAGNOSIS — M51.26 LUMBAR HERNIATED DISC: Primary | ICD-10-CM

## 2021-06-11 PROCEDURE — 99213 OFFICE O/P EST LOW 20 MIN: CPT | Performed by: PHYSICAL MEDICINE & REHABILITATION

## 2021-06-11 RX ORDER — GABAPENTIN 600 MG/1
600 TABLET ORAL 3 TIMES DAILY
Qty: 270 TABLET | Refills: 1 | Status: SHIPPED | OUTPATIENT
Start: 2021-06-11 | End: 2021-06-22

## 2021-06-11 NOTE — LETTER
6/11/2021 Patient: Mindy Kauffmna YOB: 1960 Date of Visit: 6/11/2021 Tessie Arana, [address] [facility] [phone] [place] [id number] Via Fax: [phone] Dear Tessie Arana MD, Thank you for referring Mr. Glenn Garcia to [address] for evaluation. My notes for this consultation are attached. If you have questions, please do not hesitate to call me. I look forward to following your patient along with you. Sincerely, Marilyn Pepper MD

## 2021-12-01 DIAGNOSIS — G89.29 CHRONIC BILATERAL LOW BACK PAIN WITH RIGHT-SIDED SCIATICA: ICD-10-CM

## 2021-12-01 DIAGNOSIS — M54.16 RADICULOPATHY, LUMBAR REGION: ICD-10-CM

## 2021-12-01 DIAGNOSIS — M54.41 CHRONIC BILATERAL LOW BACK PAIN WITH RIGHT-SIDED SCIATICA: ICD-10-CM

## 2021-12-01 RX ORDER — GABAPENTIN 600 MG/1
600 TABLET ORAL 3 TIMES DAILY
Qty: 270 TABLET | Refills: 1 | OUTPATIENT
Start: 2021-12-01

## 2021-12-01 NOTE — TELEPHONE ENCOUNTER
His las RX was actually given on 6-11-21. It was discontinued on his list probably done when he was being triaged within [facility] because the triage list showed 2 of them and that was the one they clicked not taking on so it removed it from the med list in his chart.

## 2021-12-01 NOTE — TELEPHONE ENCOUNTER
Patient called in stating his previous pharmacy [facility] has closed down and he is unable to refill his prescription (gabapentin (Neurontin) 600 mg tablet )     Patient is requesting if this medication can be transferred  over to the [facility] in [place]    Patient's contact is [phone]

## 2021-12-02 RX ORDER — GABAPENTIN 600 MG/1
600 TABLET ORAL 3 TIMES DAILY
Qty: 270 TABLET | Refills: 1 | Status: SHIPPED | OUTPATIENT
Start: 2021-12-02

## 2021-12-08 NOTE — PROGRESS NOTES
[facility]  [address], [place]   Phone: [phone]  Fax: [phone]        PROGRESS NOTE      HISTORY OF PRESENT ILLNESS:  The patient is a 57 y.o. male and was seen today for follow up of centralized low back pain. Previously, he was seen for increase in low back pain extending into the RLE following exacerbation at the gym x 2 weeks. Previously, he was seen for chronic low back and left buttock pain radiating in an S1 distribution to the calf. He states pain is exacerbated when bending. Pt reports he was doing fairly well until he fell off of a ladder on June 25, 2015. He has subsequently been seen in the emergency room at [facility]. Notes are reviewed. According to the patient, he had a CT scan, which is nondiagnostic. The previous fall was apparently covered under Workers' Compensation however, we have no record of this. He was seen by Brian Ricketts NP on 10/13/16 and stated the right-sided L4-L5 facet joint blocks performed on 9/29/15 provided two days of relief. Pt has previously been treated with MDP. He was taking Neurontin 800 mg TID with significant relief. He decreased to Neurontin 600 mg TID without an increase in pain. He performs his HEP daily. Pt uses cryotherapy for flare ups occasionally with benefit. ER note from Dr. Heather Calabrese 8/11/19 indicating patient presented for chest pain. Cardiac enzymes negative. Per pt, they found chest pain was due to acid reflux. Note from Raven Tobin NP dated 12/23/2020 indicating patient was seen with c/o low back and right buttocks pain. A couple weeks ago pt was running and felt a pop in his back. His flare up lasted about 1 week. He continues on Neurontin 600 mg TID. Lumbar spine MRI dated 4/2/13 per report revealed at L4-L5 there was a shallow posterior disc protrusion with annular fissure. There was moderate bilateral neuroforaminal stenosis.  At L5-S1, there was a shallow posterior disc protrusion and moderate bilateral neuroforaminal stenosis. Preliminary reading of lumbar spine 2 to 3 views revealed paper clip markings over right L4-L5 facet, mild degenerative changes, no acute pathology identified. At his last clinical appointment, patient wished to continue his current treatment. I provided him refills of Neurontin 600 mg TID.        The patient returns today and reports pain location and distribution remains unchanged. He rates his pain 0/10, previously 0-3/10. He states he went 7 days without the medication secondary to being in [place] as his mother recently passed away. He reports a slight increase in pain without the medication. Pt underwent hernia repair 7/2021. He states he has lost weight and is feeling much better at this time. Pt is requesting the decrease his medication at this time. Pt denies change in bowel or bladder habits.  reviewed. There is no height or weight on file to calculate BMI.     PCP: Laurie Sandoval MD      Past Medical History:   Diagnosis Date    Chronic headaches     SINCE STROKE    GERD (gastroesophageal reflux disease)     Herniated disc     History of fall 06-25-15    Carmen Call off of a ladder    Hypercholesterolemia     Hypertension     Lung nodules     Migraine     Neuroforaminal stenosis of spine     Other abnormality of red blood cells     hernia    Prostate cancer ([facility] [address] 05/10/2013    [id number] Jermyn Grade 3+4 Adenocarcinoma of the Prostate in 1/12 cores, prostate volume of 30 cm3, Pre-biopsy PSA= 9.2    Unspecified cerebral artery occlusion with cerebral infarction 10/11/2010    no residual effects, just headaches        Social History     Socioeconomic History    Marital status:      Spouse name: Not on file    Number of children: Not on file    Years of education: Not on file    Highest education level: Not on file   Occupational History    Not on file   Tobacco Use    Smoking status: Never Smoker  Smokeless tobacco: Never Used   Vaping Use    Vaping Use: Never used   Substance and Sexual Activity    Alcohol use: Yes     Alcohol/week: 0.0 standard drinks     Comment: VERY SELDOM    Drug use: No    Sexual activity: Not on file   Other Topics Concern    Not on file   Social History Narrative    Not on file     Social Determinants of Health     Financial Resource Strain:     Difficulty of Paying Living Expenses: Not on file   Food Insecurity:     Worried About Running Out of Food in the Last Year: Not on file    [place] of Food in the Last Year: Not on file   Transportation Needs:     Lack of Transportation (Medical): Not on file    Lack of Transportation (Non-Medical): Not on file   Physical Activity:     Days of Exercise per Week: Not on file    Minutes of Exercise per Session: Not on file   Stress:     Feeling of Stress : Not on file   Social Connections:     Frequency of Communication with Friends and Family: Not on file    Frequency of Social Gatherings with Friends and Family: Not on file    Attends [employer] Services: Not on file    Active Member of [address] [place] [address] or Organizations: Not on file    Attends Club or Organization Meetings: Not on file    Marital Status: Not on file   Intimate Partner Violence:     Fear of Current or Ex-Partner: Not on file    Emotionally Abused: Not on file    Physically Abused: Not on file    Sexually Abused: Not on file   Housing Stability:     Unable to Pay for Housing in the Last Year: Not on file    Number of [place] in the Last Year: Not on file    Unstable Housing in the Last Year: Not on file       Current Outpatient Medications   Medication Sig Dispense Refill    gabapentin (NEURONTIN) 600 mg tablet Take 1 Tablet by mouth three (3) times daily. Max Daily Amount: 1,800 mg. 270 Tablet 1    pantoprazole (PROTONIX) 40 mg tablet Take 40 mg by mouth daily. 5    amLODIPine (NORVASC) 5 mg tablet Take 5 mg by mouth nightly.       atorvastatin (LIPITOR) 10 mg tablet Take 10 mg by mouth daily. Allergies   Allergen Reactions    Metoprolol Succinate Other (comments)    Neosporin [Benzalkonium Chloride] Rash    Tizanidine Rash          PHYSICAL EXAMINATION    There were no vitals taken for this visit. CONSTITUTIONAL: NAD, A&O x 3  SENSATION: Decreased sensation to light touch on the RLE in a L% distribution. Otherwise, intact to light touch throughout  RANGE OF MOTION: The patient has full passive range of motion in all four extremities. MOTOR:  Straight Leg Raise: Negative, bilateral               Hip Flex Knee Ext Knee Flex Ankle DF GTE Ankle PF Tone   Right +4/5 +4/5 +4/5 +4/5 +4/5 +4/5 +4/5   Left +4/5 +4/5 +4/5 +4/5 +4/5 +4/5 +4/5       ASSESSMENT   Diagnoses and all orders for this visit:    1. Lumbar herniated disc    2. Chronic bilateral low back pain with right-sided sciatica    3. Radiculopathy, lumbar region      IMPRESSION AND PLAN:  Patient returns to the office today with c/o centralized low back pain. Multiple treatment options were discussed. Pt states he is doing well. I advised him to slowly decrease to 300 mg TID, advised to take half a tablet 600 mg TID. He did not need refills of Neurontin 600 mg TID. Patient is neurologically intact. I will see the patient back in 6 month's time or earlier if needed. Written by Raven Clarke, as dictated by Valiant Peabody, MD  I examined the patient, reviewed and agree with the note.

## 2021-12-10 ENCOUNTER — OFFICE VISIT (OUTPATIENT)
Dept: ORTHOPEDIC SURGERY | Age: 61
End: 2021-12-10
Payer: COMMERCIAL

## 2021-12-10 VITALS
HEIGHT: 74 IN | HEART RATE: 61 BPM | OXYGEN SATURATION: 98 % | BODY MASS INDEX: 30.8 KG/M2 | WEIGHT: 240 LBS | TEMPERATURE: 97.7 F

## 2021-12-10 DIAGNOSIS — M51.26 LUMBAR HERNIATED DISC: Primary | ICD-10-CM

## 2021-12-10 DIAGNOSIS — M54.41 CHRONIC BILATERAL LOW BACK PAIN WITH RIGHT-SIDED SCIATICA: ICD-10-CM

## 2021-12-10 DIAGNOSIS — M54.16 RADICULOPATHY, LUMBAR REGION: ICD-10-CM

## 2021-12-10 DIAGNOSIS — G89.29 CHRONIC BILATERAL LOW BACK PAIN WITH RIGHT-SIDED SCIATICA: ICD-10-CM

## 2021-12-10 PROCEDURE — 99213 OFFICE O/P EST LOW 20 MIN: CPT | Performed by: PHYSICAL MEDICINE & REHABILITATION

## 2021-12-10 RX ORDER — LATANOPROST 50 UG/ML
SOLUTION/ DROPS OPHTHALMIC
COMMUNITY
Start: 2021-12-01

## 2021-12-10 NOTE — PROGRESS NOTES
Kamari Kurtz presents today for   Chief Complaint   Patient presents with    Follow-up     6 month       Is someone accompanying this pt? no    Is the patient using any DME equipment during [address]? no    Depression Screening:  3 most recent PHQ Screens 12/11/2019   Little interest or pleasure in doing things Not at all   Feeling down, depressed, irritable, or hopeless Not at all   Total Score PHQ 2 0       Learning Assessment:  Learning Assessment 5/22/2019   PRIMARY LEARNER Patient   HIGHEST LEVEL OF EDUCATION - PRIMARY LEARNER  SOME COLLEGE   BARRIERS PRIMARY LEARNER [address] CAREGIVER No   PRIMARY LANGUAGE ENGLISH   LEARNER PREFERENCE PRIMARY DEMONSTRATION   ANSWERED BY Patient   RELATIONSHIP SELF       Abuse Screening:  No flowsheet data found. Fall Risk  No flowsheet data found. OPIOID RISK TOOL  No flowsheet data found. Coordination of Care:  1. Have you been to the ER, urgent care clinic since your last visit? Yes, Cold, Hernia operation  Hospitalized since your last visit? no    2. Have you seen or consulted any other health care providers outside of the [address] [place] [address] since your last visit? Eye doctor  Include any pap smears or colon screening.  no

## 2021-12-10 NOTE — LETTER
12/10/2021    Patient: Caryle Meiers   YOB: 1960   Date of Visit: 12/10/2021     Rosy Mckeon, [phone]  Via Fax: [phone]    Dear Teddy Bledsoe MD,      Thank you for referring Mr. Ryan Lundberg to [address] for evaluation. My notes for this consultation are attached. If you have questions, please do not hesitate to call me. I look forward to following your patient along with you.       Sincerely,    Vane Malcolm MD

## 2022-05-13 PROBLEM — R01.1 SYSTOLIC MURMUR: Status: ACTIVE | Noted: 2022-05-13

## 2022-05-13 PROBLEM — R10.32 LEFT INGUINAL PAIN: Status: ACTIVE | Noted: 2021-05-31

## 2022-05-13 PROBLEM — E78.00 PURE HYPERCHOLESTEROLEMIA: Status: ACTIVE | Noted: 2022-05-13

## 2022-05-13 PROBLEM — I51.7 CARDIOMEGALY: Status: ACTIVE | Noted: 2022-05-13

## 2022-05-13 PROBLEM — I20.8 ATYPICAL ANGINA (HCC): Status: ACTIVE | Noted: 2022-05-13
